# Patient Record
Sex: MALE | Race: WHITE | NOT HISPANIC OR LATINO | Employment: UNEMPLOYED | URBAN - METROPOLITAN AREA
[De-identification: names, ages, dates, MRNs, and addresses within clinical notes are randomized per-mention and may not be internally consistent; named-entity substitution may affect disease eponyms.]

---
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
## 2021-03-29 RX ORDER — FLUTICASONE PROPIONATE 50 MCG
SPRAY, SUSPENSION (ML) NASAL
Qty: 48 ML | Refills: 1 | Status: SHIPPED | OUTPATIENT
Start: 2021-03-29 | End: 2022-05-09

## 2021-06-07 ENCOUNTER — TELEPHONE (OUTPATIENT)
Dept: FAMILY MEDICINE CLINIC | Facility: CLINIC | Age: 21
End: 2021-06-07

## 2021-06-07 NOTE — TELEPHONE ENCOUNTER
ANJALI for pt's father to call us back with his questions to relay to Dr. Raymond.    HUB can document his questions for Dr. Raymond  
PATIENT FATHER (SERENITY) CALLED WITH SOME QUESTIONS AND CONCERNS REGARDING THE COVID 19 VACCINE. WOULD LIKE FOR DR. ROMERO TO CALL HIM.    PATIENT HAS RECEIVED A FIRST DOSE OF PFIZER BUT HAS QUESTIONS REGARDING SECOND DOSE.      SERENITY    [phone]  
ankle pain/injury

## 2021-06-26 DIAGNOSIS — L70.9 ACNE, UNSPECIFIED ACNE TYPE: ICD-10-CM

## 2021-06-28 RX ORDER — ADAPALENE AND BENZOYL PEROXIDE .1; 2.5 G/100G; G/100G
GEL TOPICAL
Qty: 45 G | Refills: 1 | Status: SHIPPED | OUTPATIENT
Start: 2021-06-28 | End: 2022-05-09

## 2022-05-09 ENCOUNTER — OFFICE VISIT (OUTPATIENT)
Dept: FAMILY MEDICINE CLINIC | Facility: CLINIC | Age: 22
End: 2022-05-09

## 2022-05-09 VITALS
SYSTOLIC BLOOD PRESSURE: 106 MMHG | WEIGHT: 139 LBS | TEMPERATURE: 97.5 F | HEART RATE: 78 BPM | RESPIRATION RATE: 18 BRPM | DIASTOLIC BLOOD PRESSURE: 76 MMHG | BODY MASS INDEX: 17.84 KG/M2 | HEIGHT: 74 IN

## 2022-05-09 DIAGNOSIS — Z00.00 WELL ADULT EXAM: Primary | ICD-10-CM

## 2022-05-09 PROCEDURE — 99395 PREV VISIT EST AGE 18-39: CPT | Performed by: FAMILY MEDICINE

## 2022-05-09 NOTE — PROGRESS NOTES
Chief Complaint  Annual Exam    Subjective          Caterina Ospina presents to [facility]  History of Present Illness     The patient presents today for a well examination. He is accompanied by an adult female.    Health maintenance  He states that he is still in college at [employer]. 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 He states that school is going well. He reports that he got back last Friday he drives to and from school. He states that he is not taking any medications at this time. He reports that he is no longer using the acne medicine that was given to him before. He states that it has been doing okay. He reports that he feels like he is eating healthy for the most part. He states that he does not exercise as much as he should. He reports that he sees a dentist. He states that his last eye exam was at least 2 years ago. He reports that he wears his seatbelt in vehicles. He states that he does not smoke, drink alcohol, or use drugs. He reports that he still has a tremor in his hands. He states that it is about the same. He reports that he had tried a couple of different medications, but the side effects of drowsiness were usually not enough to warrant continuing it, so he is currently not taking any medication for the tremors. He states that the tremors are just in his hands. He reports that his weight has been stable. He states that he has not been sick with a cold or cough. He reports that he does not think he has gotten sick in the past 3 years since mask wearing. He denies any hearing loss. He states that masks are required in the classrooms, but    they are not going in between classes and in the hallways. He denies any chest pain or trouble breathing. He denies any stomach problems. He denies any trouble urinating. He denies any joint or back pain. He denies any skin rashes. He states that  "he has occasional headaches. He reports that his mood has been okay. He denies any depression or anxiety. He states that he is sleeping fine. He reports that he is living on campus. He states that he does not feel like he is still growing.          Review of Systems   Constitutional: Negative.    HENT: Negative.  Negative for congestion and hearing loss.    Respiratory: Negative.  Negative for shortness of breath.    Cardiovascular: Negative.  Negative for chest pain.   Gastrointestinal: Negative.    Genitourinary: Negative.    Musculoskeletal: Negative.  Negative for arthralgias and back pain.   Skin: Negative.  Negative for rash.   Neurological: Positive for headache (occ).   Psychiatric/Behavioral: Negative.  Negative for sleep disturbance and depressed mood. The patient is not nervous/anxious.         Objective        Vital Signs:   /76   Pulse 78   Temp 97.5 °F (36.4 °C)   Resp 18   Ht 186.7 cm (73.5\")   Wt 63 kg (139 lb)   BMI 18.09 kg/m²     Physical Exam  Vitals and nursing note reviewed.   Constitutional:       General: He is not in acute distress.     Appearance: Normal appearance. He is well-developed. He is not ill-appearing.   HENT:      Head: Normocephalic and atraumatic.      Right Ear: Hearing, tympanic membrane, ear canal and external ear normal.      Left Ear: Hearing, tympanic membrane, ear canal and external ear normal.      Nose: Nose normal. No congestion or rhinorrhea.      Mouth/Throat:      Mouth: Mucous membranes are moist.      Pharynx: No oropharyngeal exudate or posterior oropharyngeal erythema.   Eyes:      General: Lids are normal.      Conjunctiva/sclera: Conjunctivae normal.      Pupils: Pupils are equal, round, and reactive to light.   Neck:      Thyroid: No thyromegaly.   Cardiovascular:      Rate and Rhythm: Normal rate and regular rhythm.      Heart sounds: Normal heart sounds. No murmur heard.    No friction rub.   Pulmonary:      Effort: Pulmonary effort is normal. " No respiratory distress.      Breath sounds: Normal breath sounds. No wheezing or rales.   Abdominal:      General: Bowel sounds are normal. There is no distension.      Palpations: Abdomen is soft. There is no mass.      Tenderness: There is no abdominal tenderness. There is no guarding or rebound.   Musculoskeletal:      Right lower leg: No edema.      Left lower leg: No edema.   Skin:     General: Skin is warm and dry.   Neurological:      General: No focal deficit present.      Mental Status: He is alert.   Psychiatric:         Mood and Affect: Mood normal.         Speech: Speech normal.         Behavior: Behavior normal.          Result Review :                     Assessment and Plan    Diagnoses and all orders for this visit:    1. Well adult exam (Primary)      1. Well examination  - Continue routine health maintenance including routine dentistry, eye exam, safety seatbelt use, proper nutrition, and exercise. He is up to date on his immunizations at this time. We will follow up in 1 year for well examination.        DISCUSSION        Follow Up   Return in about 1 year (around 5/9/2023) for Annual physical.    Patient was given instructions and counseling regarding his condition or for health maintenance advice. Please see specific information pulled into the AVS if appropriate.       Paxton Raymond MD     Transcribed from ambient dictation for Paxton Raymond MD by Nova Fernando.  05/09/22   16:48 EDT    Patient verbalized consent to the visit recording.  I have personally performed the services described in this document as transcribed by the above individual, and it is both accurate and complete.  Paxton Raymond MD  5/10/2022  18:21 EDT

## 2023-05-22 ENCOUNTER — OFFICE VISIT (OUTPATIENT)
Dept: FAMILY MEDICINE CLINIC | Facility: CLINIC | Age: 23
End: 2023-05-22
Payer: COMMERCIAL

## 2023-05-22 VITALS
BODY MASS INDEX: 19.61 KG/M2 | DIASTOLIC BLOOD PRESSURE: 60 MMHG | RESPIRATION RATE: 18 BRPM | HEIGHT: 73 IN | SYSTOLIC BLOOD PRESSURE: 102 MMHG | TEMPERATURE: 97.7 F | OXYGEN SATURATION: 98 % | WEIGHT: 148 LBS | HEART RATE: 101 BPM

## 2023-05-22 DIAGNOSIS — H61.23 BILATERAL IMPACTED CERUMEN: ICD-10-CM

## 2023-05-22 DIAGNOSIS — Z00.00 WELL ADULT EXAM: Primary | ICD-10-CM

## 2023-05-22 DIAGNOSIS — Z23 NEED FOR TDAP VACCINATION: ICD-10-CM

## 2023-05-22 NOTE — PROGRESS NOTES
Chief Complaint  Annual Exam (No concerns  )    Subjective          Caterina Ospina presents to [facility]  History of Present Illness    The patient presents today for a well examination.    Health maintenance  The patient reports that he is feeling well. He denies any concerns or issues. He denies taking any medications. He denies smoking, drinking alcohol, or drug use. He states that he has a dentist that he goes to. He states that his last eye exam was last year. He denies any trouble seeing. He denies any unexpected weight loss or gain. He states that his energy level has been good. He denies any cold, cough, or allergy problems. He denies any shortness of breath, chest pain, leg swelling, or heart skipping beats. He denies any trouble going to the bathroom. He denies any blood in his bowel movements. He states that he is urinating well. He states that he has occasional back pain from sleeping, but it is not a constant thing. He denies any neck problems. He denies any skin rashes. He denies any dizziness or passing out spells. He denies any seizure. He denies any bruise or bleed easily. He states that his mood has been generally good. He denies feeling depressed or anxious. He states that he goes to [employer]. 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 He states that he will be done next year. He states that he is not planning on going to grad school for now. He states that he would like to find a job. He states that he has received his COVID-19 vaccines. He states that he would like to get a tetanus vaccine while he is here. He states that he had COVID-19 once last summer. He states that it was not too bad when he had it compared to some other people that he knew who had. He states that it was mostly joint pain for 2 days. He denies any trouble hearing. He states that occasionally he will wake up and his ears will be clogged from ear wax.  "He states that he has had it flushed out in the past. He states that he can hear okay now. He states that he has gotten some out on his headphones before. He states that he usually wears leather over the ears for long periods of time.    Review of Systems   Constitutional: Negative.    HENT: Negative.  Negative for congestion.    Eyes: Negative.    Respiratory: Negative.  Negative for cough and shortness of breath.    Cardiovascular: Negative.  Negative for chest pain, palpitations and leg swelling.   Gastrointestinal: Negative.  Negative for blood in stool, constipation and diarrhea.   Genitourinary: Negative.    Musculoskeletal: Positive for back pain (occ but not constant). Negative for arthralgias and neck pain.   Skin: Negative.    Neurological: Negative for dizziness, seizures and syncope.   Hematological: Does not bruise/bleed easily.   Psychiatric/Behavioral: Negative.  Negative for sleep disturbance and depressed mood. The patient is not nervous/anxious.         Objective       Vital Signs:   /60   Pulse 101   Temp 97.7 °F (36.5 °C)   Resp 18   Ht 185.4 cm (73\")   Wt 67.1 kg (148 lb)   SpO2 98%   BMI 19.53 kg/m²     Physical Exam  Vitals and nursing note reviewed.   Constitutional:       General: He is not in acute distress.     Appearance: Normal appearance. He is well-developed. He is not ill-appearing.   HENT:      Head: Normocephalic and atraumatic.      Right Ear: Hearing and external ear normal. There is impacted cerumen.      Left Ear: Hearing and external ear normal. There is impacted cerumen.      Nose: Nose normal. No congestion or rhinorrhea.      Mouth/Throat:      Mouth: Mucous membranes are moist.      Pharynx: No oropharyngeal exudate or posterior oropharyngeal erythema.   Eyes:      General: Lids are normal.      Conjunctiva/sclera: Conjunctivae normal.      Pupils: Pupils are equal, round, and reactive to light.   Neck:      Thyroid: No thyromegaly.   Cardiovascular:      Rate " and Rhythm: Normal rate and regular rhythm.      Heart sounds: Normal heart sounds. No murmur heard.    No friction rub.   Pulmonary:      Effort: Pulmonary effort is normal. No respiratory distress.      Breath sounds: Normal breath sounds. No wheezing or rales.   Abdominal:      General: Bowel sounds are normal. There is no distension.      Palpations: Abdomen is soft. There is no mass.      Tenderness: There is no abdominal tenderness. There is no guarding or rebound.   Musculoskeletal:      Right lower leg: No edema.      Left lower leg: No edema.   Skin:     General: Skin is warm and dry.   Neurological:      General: No focal deficit present.      Mental Status: He is alert.   Psychiatric:         Mood and Affect: Mood normal.         Speech: Speech normal.         Behavior: Behavior normal.        Soft wax was removed with a curette but harder wax was behind that.  Tympanic membranes partially visualized and appeared normal.    Result Review :                     Assessment and Plan    Diagnoses and all orders for this visit:    1. Well adult exam (Primary)    2. Need for Tdap vaccination  -     Tdap Vaccine Greater Than or Equal To 8yo IM    3. Bilateral impacted cerumen          DISCUSSION    1. Well examination  - Continue routine health maintenance including routine dentistry, eye exams, safety exercise, and proper nutrition.  - Tdap given today to get him caught up on that.    2. Bilateral cerumen impaction  - He had bilateral cerumen impaction. I was able to remove some of the soft wax and visualized the eardrum partially. He has some harder wax behind that.  - Recommend hydrogen peroxide drops at bedtime to help soften that wax and call if not improving.  - May need a full flush if does not resolve.        Follow Up   Return in about 1 year (around 5/22/2024).    Patient was given instructions and counseling regarding his condition or for health maintenance advice. Please see specific information  pulled into the AVS if appropriate.       Paxton Raymond MD     Transcribed from ambient dictation for Paxton Raymond MD by Kathrin Weaver.  05/22/23   17:41 EDT    Patient or patient representative verbalized consent to the visit recording.  I have personally performed the services described in this document as transcribed by the above individual, and it is both accurate and complete.  Paxton Raymond MD  5/23/2023  18:10 EDT

## 2023-12-27 ENCOUNTER — OFFICE VISIT (OUTPATIENT)
Dept: FAMILY MEDICINE CLINIC | Facility: CLINIC | Age: 23
End: 2023-12-27
Payer: COMMERCIAL

## 2023-12-27 VITALS
WEIGHT: 156.2 LBS | TEMPERATURE: 98.4 F | BODY MASS INDEX: 20.7 KG/M2 | SYSTOLIC BLOOD PRESSURE: 120 MMHG | OXYGEN SATURATION: 98 % | HEIGHT: 73 IN | DIASTOLIC BLOOD PRESSURE: 62 MMHG | HEART RATE: 67 BPM | RESPIRATION RATE: 16 BRPM

## 2023-12-27 DIAGNOSIS — R10.13 DYSPEPSIA: Primary | ICD-10-CM

## 2023-12-27 PROBLEM — G25.89 ORGANIC WRITER'S CRAMP: Status: ACTIVE | Noted: 2019-01-03

## 2023-12-27 PROCEDURE — 99213 OFFICE O/P EST LOW 20 MIN: CPT | Performed by: FAMILY MEDICINE

## 2023-12-27 RX ORDER — OMEPRAZOLE 40 MG/1
40 CAPSULE, DELAYED RELEASE ORAL DAILY
Qty: 30 CAPSULE | Refills: 5 | Status: SHIPPED | OUTPATIENT
Start: 2023-12-27

## 2023-12-27 NOTE — PROGRESS NOTES
Subjective   Caterina Ospina is a 23 y.o. male.     GI Problem  The primary symptoms include abdominal pain (hpi). Primary symptoms do not include nausea, vomiting or diarrhea.   The illness does not include constipation.        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    Review of Systems   Constitutional: Negative.    Gastrointestinal:  Positive for abdominal pain (hpi). Negative for constipation, diarrhea, nausea and vomiting.       Objective   Physical Exam  Vitals and nursing note reviewed.   Constitutional:       General: He is not in acute distress.     Appearance: Normal appearance. He is well-developed.   Cardiovascular:      Rate and Rhythm: Normal rate and regular rhythm.      Heart sounds: Normal heart sounds.   Pulmonary:      Effort: Pulmonary effort is normal.      Breath sounds: Normal breath sounds.   Abdominal:      General: Abdomen is flat. Bowel sounds are normal. There is no distension.      Palpations: Abdomen is soft.      Tenderness: There is no abdominal tenderness. There is no guarding or rebound.   Neurological:      Mental Status: He is alert and oriented to person, place, and time.   Psychiatric:         Mood and Affect: Mood normal.         Behavior: Behavior normal.         Thought Content: Thought content normal.         Judgment: Judgment normal.       Assessment & Plan   Diagnoses and all orders for this visit:    1. Dyspepsia (Primary)  -     omeprazole  (priLOSEC) 40 MG capsule; Take 1 capsule by mouth Daily.  Dispense: 30 capsule; Refill: 5    Discussed etiology of dyspepsia, gastritis, PUD with pt and I do feel this is dyspepsia.  Will treat with prilosec daily (or as often as he can remember!) and he will call back INB  Discussed EGD but will try medicine first.

## 2024-06-16 DIAGNOSIS — R10.13 DYSPEPSIA: ICD-10-CM

## 2024-06-18 RX ORDER — OMEPRAZOLE 40 MG/1
40 CAPSULE, DELAYED RELEASE ORAL DAILY
Qty: 90 CAPSULE | Refills: 1 | Status: SHIPPED | OUTPATIENT
Start: 2024-06-18

## 2024-07-05 ENCOUNTER — OFFICE VISIT (OUTPATIENT)
Dept: FAMILY MEDICINE CLINIC | Facility: CLINIC | Age: 24
End: 2024-07-05
Payer: COMMERCIAL

## 2024-07-05 VITALS
RESPIRATION RATE: 18 BRPM | BODY MASS INDEX: 19.88 KG/M2 | TEMPERATURE: 97.5 F | SYSTOLIC BLOOD PRESSURE: 102 MMHG | HEIGHT: 73 IN | HEART RATE: 76 BPM | WEIGHT: 150 LBS | DIASTOLIC BLOOD PRESSURE: 68 MMHG

## 2024-07-05 DIAGNOSIS — R53.83 OTHER FATIGUE: ICD-10-CM

## 2024-07-05 DIAGNOSIS — R10.13 DYSPEPSIA: ICD-10-CM

## 2024-07-05 DIAGNOSIS — Z00.00 WELL ADULT EXAM: Primary | ICD-10-CM

## 2024-07-05 PROCEDURE — 99395 PREV VISIT EST AGE 18-39: CPT | Performed by: FAMILY MEDICINE

## 2024-07-05 NOTE — PROGRESS NOTES
Chief Complaint  Annual Exam    Subjective          Caterina Ospina presents to [facility]    History of Present Illness  The patient is a 23-year-old male who is here for well examination.    The patient reports a general sense of well-being with no significant health issues. He continues to experience tremors, which have not worsened. He continues to take omeprazole, prescribed by Dr. Ren, which has been effective in managing his acid reflux. He denies any current allergy issues. His last ophthalmological examination was conducted 2 weeks ago, with no reported vision problems. He reports experiencing fatigue, but denies any recent illness, cough, shortness of breath, or chest pain. He denies any gastrointestinal issues apart from acid reflux. He denies any urinary issues, joint pain, back pain, numbness, or tingling. His mood remains stable, and he denies any feelings of depression or anxiety. His sleep pattern is inconsistent, with some days sleeping more, and other days sleeping fine. Once he falls asleep, he does not experience difficulty maintaining sleep. He plans to receive the COVID-19 vaccine this fall. He has not undergone any recent blood work. He reports a decreased appetite, but denies any weight loss. He denies any testicular issues or swelling.   He does not smoke, drink alcohol, or use drugs.    Recently graduated from [employer] with sofi degree.  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    OTHER NOTES:        Review of Systems   Constitutional:  Positive for fatigue.   HENT: Negative.  Negative for congestion.    Eyes: Negative.    Respiratory:  Negative for cough and shortness of breath.    Cardiovascular:  Negative for chest pain.   Gastrointestinal: Negative.  Positive for GERD. Negative for constipation.   Genitourinary: Negative.         No testicle swelling or issues  "  Musculoskeletal: Negative.  Negative for arthralgias and back pain.   Neurological:  Positive for tremors. Negative for numbness.   Psychiatric/Behavioral:  Positive for sleep disturbance (at times) and stress. Negative for depressed mood. The patient is not nervous/anxious.         Objective       Vital Signs:   /68   Pulse 76   Temp 97.5 °F (36.4 °C)   Resp 18   Ht 185.4 cm (73\")   Wt 68 kg (150 lb)   BMI 19.79 kg/m²     Physical Exam  Vitals and nursing note reviewed.   Constitutional:       General: He is not in acute distress.     Appearance: Normal appearance. He is well-developed. He is not ill-appearing.   HENT:      Head: Normocephalic and atraumatic.      Right Ear: Hearing, tympanic membrane, ear canal and external ear normal.      Left Ear: Hearing, tympanic membrane, ear canal and external ear normal.      Nose: Nose normal. No congestion or rhinorrhea.      Mouth/Throat:      Mouth: Mucous membranes are moist.      Pharynx: No oropharyngeal exudate or posterior oropharyngeal erythema.   Eyes:      General: Lids are normal.      Conjunctiva/sclera: Conjunctivae normal.      Pupils: Pupils are equal, round, and reactive to light.   Neck:      Thyroid: No thyromegaly.   Cardiovascular:      Rate and Rhythm: Normal rate and regular rhythm.      Heart sounds: Normal heart sounds. No murmur heard.     No friction rub.   Pulmonary:      Effort: Pulmonary effort is normal. No respiratory distress.      Breath sounds: Normal breath sounds. No wheezing or rales.   Abdominal:      General: Bowel sounds are normal. There is no distension.      Palpations: Abdomen is soft. There is no mass.      Tenderness: There is no abdominal tenderness. There is no guarding or rebound.   Musculoskeletal:      Right lower leg: No edema.      Left lower leg: No edema.   Skin:     General: Skin is warm and dry.   Neurological:      General: No focal deficit present.      Mental Status: He is alert.   Psychiatric:    "      Mood and Affect: Mood normal.         Speech: Speech normal.         Behavior: Behavior normal.            Physical Exam      Result Review :            Other Results    Results               Assessment and Plan    Diagnoses and all orders for this visit:    1. Well adult exam (Primary)  -     CBC & Differential  -     Comprehensive Metabolic Panel  -     Lipid Panel With / Chol / HDL Ratio  -     TSH    2. Dyspepsia    3. Other fatigue  -     CBC & Differential  -     TSH               DISCUSSION    Assessment & Plan  1. Well examination.  The patient is here for a well examination. The patient is advised to persist with routine health maintenance, including routine dentistry, eye exams, safety measures such as seatbelt use, exercise, and a healthy diet. His vaccinations are up-to-date. Screening blood work is recommended, and a further plan will be made once the results are available. The patient is also experiencing some fatigue, so a TSH test will be conducted. His tremors have stabilized and there is no current need for medication. His acid reflux is also well-managed with omeprazole, which will be continued for the time being. The prescription was recently refilled, hence it will be discontinued if symptoms improve over the next 6 to 12 months.        Follow Up   Return in about 1 year (around 7/5/2025) for Annual physical.    Patient was given instructions and counseling regarding his condition or for health maintenance advice. Please see specific information pulled into the AVS if appropriate.       Paxton Raymond MD    Patient or patient representative verbalized consent for the use of Ambient Listening during the visit with  Paxton Raymond MD for chart documentation. 7/5/2024  10:31 EDT

## 2024-07-06 LAB
ALBUMIN SERPL-MCNC: 4.5 G/DL (ref 4.3–5.2)
ALP SERPL-CCNC: 90 IU/L (ref 44–121)
ALT SERPL-CCNC: 15 IU/L (ref 0–44)
AST SERPL-CCNC: 16 IU/L (ref 0–40)
BASOPHILS # BLD AUTO: 0 X10E3/UL (ref 0–0.2)
BASOPHILS NFR BLD AUTO: 1 %
BILIRUB SERPL-MCNC: 0.4 MG/DL (ref 0–1.2)
BUN SERPL-MCNC: 9 MG/DL (ref 6–20)
BUN/CREAT SERPL: 11 (ref 9–20)
CALCIUM SERPL-MCNC: 9.8 MG/DL (ref 8.7–10.2)
CHLORIDE SERPL-SCNC: 102 MMOL/L (ref 96–106)
CHOLEST SERPL-MCNC: 153 MG/DL (ref 100–199)
CHOLEST/HDLC SERPL: 4.3 RATIO (ref 0–5)
CO2 SERPL-SCNC: 25 MMOL/L (ref 20–29)
CREAT SERPL-MCNC: 0.8 MG/DL (ref 0.76–1.27)
EGFRCR SERPLBLD CKD-EPI 2021: 128 ML/MIN/1.73
EOSINOPHIL # BLD AUTO: 0.1 X10E3/UL (ref 0–0.4)
EOSINOPHIL NFR BLD AUTO: 2 %
ERYTHROCYTE [DISTWIDTH] IN BLOOD BY AUTOMATED COUNT: 12.5 % (ref 11.6–15.4)
GLOBULIN SER CALC-MCNC: 2.4 G/DL (ref 1.5–4.5)
GLUCOSE SERPL-MCNC: 90 MG/DL (ref 70–99)
HCT VFR BLD AUTO: 48.2 % (ref 37.5–51)
HDLC SERPL-MCNC: 36 MG/DL
HGB BLD-MCNC: 16.6 G/DL (ref 13–17.7)
IMM GRANULOCYTES # BLD AUTO: 0 X10E3/UL (ref 0–0.1)
IMM GRANULOCYTES NFR BLD AUTO: 0 %
LDLC SERPL CALC-MCNC: 71 MG/DL (ref 0–99)
LYMPHOCYTES # BLD AUTO: 1.9 X10E3/UL (ref 0.7–3.1)
LYMPHOCYTES NFR BLD AUTO: 30 %
MCH RBC QN AUTO: 29.9 PG (ref 26.6–33)
MCHC RBC AUTO-ENTMCNC: 34.4 G/DL (ref 31.5–35.7)
MCV RBC AUTO: 87 FL (ref 79–97)
MONOCYTES # BLD AUTO: 0.5 X10E3/UL (ref 0.1–0.9)
MONOCYTES NFR BLD AUTO: 8 %
NEUTROPHILS # BLD AUTO: 3.7 X10E3/UL (ref 1.4–7)
NEUTROPHILS NFR BLD AUTO: 59 %
PLATELET # BLD AUTO: 218 X10E3/UL (ref 150–450)
POTASSIUM SERPL-SCNC: 3.7 MMOL/L (ref 3.5–5.2)
PROT SERPL-MCNC: 6.9 G/DL (ref 6–8.5)
RBC # BLD AUTO: 5.55 X10E6/UL (ref 4.14–5.8)
SODIUM SERPL-SCNC: 141 MMOL/L (ref 134–144)
TRIGL SERPL-MCNC: 282 MG/DL (ref 0–149)
TSH SERPL DL<=0.005 MIU/L-ACNC: 1.59 UIU/ML (ref 0.45–4.5)
VLDLC SERPL CALC-MCNC: 46 MG/DL (ref 5–40)
WBC # BLD AUTO: 6.3 X10E3/UL (ref 3.4–10.8)

## 2024-07-25 ENCOUNTER — OFFICE VISIT (OUTPATIENT)
Dept: FAMILY MEDICINE CLINIC | Facility: CLINIC | Age: 24
End: 2024-07-25
Payer: COMMERCIAL

## 2024-07-25 VITALS
DIASTOLIC BLOOD PRESSURE: 68 MMHG | HEIGHT: 73 IN | OXYGEN SATURATION: 97 % | BODY MASS INDEX: 19.88 KG/M2 | RESPIRATION RATE: 15 BRPM | SYSTOLIC BLOOD PRESSURE: 110 MMHG | HEART RATE: 67 BPM | WEIGHT: 150 LBS | TEMPERATURE: 97.3 F

## 2024-07-25 DIAGNOSIS — B37.0 THRUSH: Primary | ICD-10-CM

## 2024-07-25 PROCEDURE — 99213 OFFICE O/P EST LOW 20 MIN: CPT | Performed by: FAMILY MEDICINE

## 2025-01-09 DIAGNOSIS — R10.13 DYSPEPSIA: ICD-10-CM

## 2025-01-09 RX ORDER — OMEPRAZOLE 40 MG/1
40 CAPSULE, DELAYED RELEASE ORAL DAILY
Qty: 90 CAPSULE | Refills: 1 | Status: SHIPPED | OUTPATIENT
Start: 2025-01-09

## 2025-03-05 DIAGNOSIS — B37.0 THRUSH: ICD-10-CM

## 2025-03-06 RX ORDER — NYSTATIN 100000 [USP'U]/ML
500000 SUSPENSION ORAL 4 TIMES DAILY
Qty: 200 ML | Refills: 0 | Status: SHIPPED | OUTPATIENT
Start: 2025-03-06

## 2025-07-07 ENCOUNTER — OFFICE VISIT (OUTPATIENT)
Dept: FAMILY MEDICINE CLINIC | Facility: CLINIC | Age: 25
End: 2025-07-07
Payer: COMMERCIAL

## 2025-07-07 VITALS
SYSTOLIC BLOOD PRESSURE: 114 MMHG | HEIGHT: 73 IN | RESPIRATION RATE: 18 BRPM | HEART RATE: 78 BPM | BODY MASS INDEX: 22 KG/M2 | TEMPERATURE: 98 F | DIASTOLIC BLOOD PRESSURE: 82 MMHG | WEIGHT: 166 LBS

## 2025-07-07 DIAGNOSIS — M25.531 RIGHT WRIST PAIN: ICD-10-CM

## 2025-07-07 DIAGNOSIS — Z00.00 WELL ADULT EXAM: Primary | ICD-10-CM

## 2025-07-07 DIAGNOSIS — K21.9 GASTROESOPHAGEAL REFLUX DISEASE WITHOUT ESOPHAGITIS: ICD-10-CM

## 2025-07-07 DIAGNOSIS — R10.13 DYSPEPSIA: ICD-10-CM

## 2025-07-07 DIAGNOSIS — Z79.899 HIGH RISK MEDICATION USE: ICD-10-CM

## 2025-07-07 PROCEDURE — 99395 PREV VISIT EST AGE 18-39: CPT | Performed by: FAMILY MEDICINE

## 2025-07-07 RX ORDER — OMEPRAZOLE 40 MG/1
40 CAPSULE, DELAYED RELEASE ORAL DAILY
Qty: 90 CAPSULE | Refills: 1 | Status: SHIPPED | OUTPATIENT
Start: 2025-07-07

## 2025-07-07 NOTE — PROGRESS NOTES
Chief Complaint  Annual Exam    Subjective          Caterina Ospina presents to [facility]    HPI         23-year-old male here for follow-up and well examination.    Experiencing right wrist pain for 2-3 weeks, no injury reported. Extensive computer use, primarily mouse work. No swelling. Pain initially improved during a trip to [place] but returned, possibly exacerbated by horseback riding. Advil provided some relief. No numbness or tingling.    Takes omeprazole daily for acid reflux for 2 years. No muscle cramps or heartburn. Experiences acid reflux if dose missed. No endoscopy. High caffeine intake: one cup of coffee and one soda per day, occasionally more. No smoking or alcohol. No recent cold or cough. No vision problems, uses glasses for night driving. Dental appointment scheduled. No blood in bowel movements or trouble urinating. No other pain, skin spots, rashes, dizziness, fainting spells, or headaches. Mood generally good. Sleep varies: 6-7 hours if early bedtime, up to 12 hours if late bedtime, feeling groggy. Tremors persist, not worsened. Plans to get COVID-19 vaccine this year. Occasional left knee aches with weather changes.    SOCIAL HISTORY  - Does not smoke  - Does not drink alcohol  - Consumes about 1 cup of coffee and 1 soda a day, sometimes more       OTHER NOTES:          Review of Systems   Constitutional:  Negative for fatigue.   HENT: Negative.  Negative for congestion.    Eyes: Negative.    Respiratory: Negative.  Negative for cough.    Cardiovascular: Negative.    Gastrointestinal: Negative.  Positive for GERD. Negative for anal bleeding and blood in stool.   Genitourinary: Negative.  Negative for frequency.   Musculoskeletal:  Positive for arthralgias. Negative for back pain.   Skin: Negative.  Negative for rash.   Neurological:  Positive for tremors (not worse. no change in last 2 years). Negative for dizziness, light-headedness and headache.  "  Psychiatric/Behavioral:  Positive for sleep disturbance (inconsistent). Negative for depressed mood. The patient is not nervous/anxious.         Objective       Vital Signs:   /82   Pulse 78   Temp 98 °F (36.7 °C)   Resp 18   Ht 185.4 cm (73\")   Wt 75.3 kg (166 lb)   BMI 21.90 kg/m²     Physical Exam  Vitals and nursing note reviewed.   Constitutional:       General: He is not in acute distress.     Appearance: Normal appearance. He is well-developed. He is not ill-appearing.   HENT:      Head: Normocephalic and atraumatic.      Right Ear: Hearing, tympanic membrane, ear canal and external ear normal.      Left Ear: Hearing, tympanic membrane, ear canal and external ear normal.      Nose: Nose normal. No congestion or rhinorrhea.      Mouth/Throat:      Mouth: Mucous membranes are moist.      Pharynx: No oropharyngeal exudate or posterior oropharyngeal erythema.   Eyes:      General: Lids are normal.      Conjunctiva/sclera: Conjunctivae normal.      Pupils: Pupils are equal, round, and reactive to light.   Neck:      Thyroid: No thyromegaly.   Cardiovascular:      Rate and Rhythm: Normal rate and regular rhythm.      Heart sounds: Normal heart sounds. No murmur heard.     No friction rub.   Pulmonary:      Effort: Pulmonary effort is normal. No respiratory distress.      Breath sounds: Normal breath sounds. No wheezing or rales.   Abdominal:      General: Bowel sounds are normal. There is no distension.      Palpations: Abdomen is soft. There is no mass.      Tenderness: There is no abdominal tenderness. There is no guarding or rebound.   Musculoskeletal:      Right lower leg: No edema.      Left lower leg: No edema.   Skin:     General: Skin is warm and dry.   Neurological:      Mental Status: He is alert.   Psychiatric:         Mood and Affect: Mood normal.         Speech: Speech normal.         Behavior: Behavior normal.        Good range of motion of the right wrist.  Nontender.  No redness, " warmth or swelling              Result Review :            Other Results    Results               Assessment and Plan    Diagnoses and all orders for this visit:    1. Well adult exam (Primary)  -     Comprehensive Metabolic Panel  -     CBC & Differential  -     Lipid Panel With / Chol / HDL Ratio    2. Right wrist pain    3. Gastroesophageal reflux disease without esophagitis  -     Ambulatory referral for Screening EGD    4. High risk medication use  -     Magnesium    5. Dyspepsia  -     omeprazole (priLOSEC) 40 MG capsule; Take 1 capsule by mouth Daily.  Dispense: 90 capsule; Refill: 1               DISCUSSION    Here for well examination.  Continue routine health maintenance including routine dentistry, eye exam, safety, seatbelt use, exercise and proper nutrition.     Right wrist pain.  Mild tendinitis is suspected with no swelling and pain localized around the wrist. The patient is advised to take Advil or Motrin twice daily for a week. Further evaluation for carpal tunnel syndrome will be necessary if the pain persists or if numbness/tingling occurs.    Acid reflux.  The patient has been on omeprazole 40 mg daily for 2 years and experiences acid reflux if doses are missed. Long-term use of omeprazole may lead to low magnesium levels. A referral to a gastroenterologist is made for persistent acid reflux and rule out Leiva's esophagus. Blood work is ordered to monitor magnesium levels, and an omeprazole refill has been sent to the pharmacy.  Recommend EGD.    Health maintenance.  The patient's weight is within the normal range, with a slight increase since last year. The patient is up to date on vaccinations and plans to get the new COVID-19 vaccine in the fall. Consistent sleep schedule is advised. Annual blood work is ordered to monitor overall health.           Follow Up   Return in about 1 year (around 7/7/2026) for Annual physical.    Patient was given instructions and counseling regarding his  condition or for health maintenance advice. Please see specific information pulled into the AVS if appropriate.       Paxton Raymond MD    Patient or patient representative verbalized consent for the use of Ambient Listening during the visit with  Paxton Raymond MD for chart documentation. 7/7/2025  14:12 EDT

## 2025-07-08 LAB
ALBUMIN SERPL-MCNC: 4.7 G/DL (ref 4.3–5.2)
ALP SERPL-CCNC: 90 IU/L (ref 44–121)
ALT SERPL-CCNC: 55 IU/L (ref 0–44)
AST SERPL-CCNC: 30 IU/L (ref 0–40)
BASOPHILS # BLD AUTO: 0.1 X10E3/UL (ref 0–0.2)
BASOPHILS NFR BLD AUTO: 1 %
BILIRUB SERPL-MCNC: 0.6 MG/DL (ref 0–1.2)
BUN SERPL-MCNC: 7 MG/DL (ref 6–20)
BUN/CREAT SERPL: 9 (ref 9–20)
CALCIUM SERPL-MCNC: 9.9 MG/DL (ref 8.7–10.2)
CHLORIDE SERPL-SCNC: 100 MMOL/L (ref 96–106)
CHOLEST SERPL-MCNC: 181 MG/DL (ref 100–199)
CHOLEST/HDLC SERPL: 4.3 RATIO (ref 0–5)
CO2 SERPL-SCNC: 23 MMOL/L (ref 20–29)
CREAT SERPL-MCNC: 0.76 MG/DL (ref 0.76–1.27)
EGFRCR SERPLBLD CKD-EPI 2021: 130 ML/MIN/1.73
EOSINOPHIL # BLD AUTO: 0.1 X10E3/UL (ref 0–0.4)
EOSINOPHIL NFR BLD AUTO: 2 %
ERYTHROCYTE [DISTWIDTH] IN BLOOD BY AUTOMATED COUNT: 13.1 % (ref 11.6–15.4)
GLOBULIN SER CALC-MCNC: 2.3 G/DL (ref 1.5–4.5)
GLUCOSE SERPL-MCNC: 87 MG/DL (ref 70–99)
HCT VFR BLD AUTO: 52.7 % (ref 37.5–51)
HDLC SERPL-MCNC: 42 MG/DL
HGB BLD-MCNC: 17.5 G/DL (ref 13–17.7)
IMM GRANULOCYTES # BLD AUTO: 0 X10E3/UL (ref 0–0.1)
IMM GRANULOCYTES NFR BLD AUTO: 0 %
LDLC SERPL CALC-MCNC: 113 MG/DL (ref 0–99)
LYMPHOCYTES # BLD AUTO: 1.4 X10E3/UL (ref 0.7–3.1)
LYMPHOCYTES NFR BLD AUTO: 26 %
MAGNESIUM SERPL-MCNC: 2 MG/DL (ref 1.6–2.3)
MCH RBC QN AUTO: 29.8 PG (ref 26.6–33)
MCHC RBC AUTO-ENTMCNC: 33.2 G/DL (ref 31.5–35.7)
MCV RBC AUTO: 90 FL (ref 79–97)
MONOCYTES # BLD AUTO: 0.5 X10E3/UL (ref 0.1–0.9)
MONOCYTES NFR BLD AUTO: 9 %
NEUTROPHILS # BLD AUTO: 3.5 X10E3/UL (ref 1.4–7)
NEUTROPHILS NFR BLD AUTO: 62 %
PLATELET # BLD AUTO: 228 X10E3/UL (ref 150–450)
POTASSIUM SERPL-SCNC: 4.7 MMOL/L (ref 3.5–5.2)
PROT SERPL-MCNC: 7 G/DL (ref 6–8.5)
RBC # BLD AUTO: 5.87 X10E6/UL (ref 4.14–5.8)
SODIUM SERPL-SCNC: 140 MMOL/L (ref 134–144)
TRIGL SERPL-MCNC: 147 MG/DL (ref 0–149)
VLDLC SERPL CALC-MCNC: 26 MG/DL (ref 5–40)
WBC # BLD AUTO: 5.5 X10E3/UL (ref 3.4–10.8)

## 2025-08-15 ENCOUNTER — LAB (OUTPATIENT)
Dept: FAMILY MEDICINE CLINIC | Facility: CLINIC | Age: 25
End: 2025-08-15
Payer: COMMERCIAL